# Patient Record
Sex: FEMALE | Race: WHITE
[De-identification: names, ages, dates, MRNs, and addresses within clinical notes are randomized per-mention and may not be internally consistent; named-entity substitution may affect disease eponyms.]

---

## 2018-01-04 ENCOUNTER — HOSPITAL ENCOUNTER (OUTPATIENT)
Dept: HOSPITAL 63 - MAMMO | Age: 78
Discharge: HOME | End: 2018-01-04
Payer: MEDICARE

## 2018-01-04 DIAGNOSIS — Z12.31: Primary | ICD-10-CM

## 2018-01-04 PROCEDURE — 77063 BREAST TOMOSYNTHESIS BI: CPT

## 2018-01-04 PROCEDURE — 77067 SCR MAMMO BI INCL CAD: CPT

## 2018-01-04 NOTE — RAD
DATE: 1/4/2018



EXAM: MAMMO BREANNA SCREENING BILATERAL



HISTORY: Routine screening



COMPARISON: 6/18/2014



This study was interpreted with the benefit of Computerized Aided Detection

(CAD).





The breast parenchyma is heterogeneously dense, which could reduce sensitivity

of mammography. Breast parenchyma level C.





FINDINGS: 2-D and 3-D tomosynthesis imaging was performed in CC and MLO

projections. No new or enlarging breast densities are seen. Several benign

type calcifications are noted. No suspicious microcalcifications have

developed.  





IMPRESSION: There is no mammographic evidence of malignancy in either breast.





BI-RADS CATEGORY: 2 BENIGN FINDING(S)



RECOMMENDED FOLLOW-UP: 12M 12 MONTH FOLLOW-UP



PQRS compliance statement: Patient information was entered into a reminder

system with a target due date     for the next mammogram.



Mammography is a sensitive method for finding small breast cancers, but it

does not detect them all and is not a substitute for careful clinical

examination.  A negative mammogram does not negate a clinically suspicious

finding and should not result in delay in biopsying a clinically suspicious

abnormality.



"Our facility is accredited by the [employer] Mammography

Program."

## 2018-02-16 ENCOUNTER — HOSPITAL ENCOUNTER (OUTPATIENT)
Dept: HOSPITAL 63 - SURG | Age: 78
Discharge: HOME | End: 2018-02-16
Attending: INTERNAL MEDICINE
Payer: MEDICARE

## 2018-02-16 VITALS
DIASTOLIC BLOOD PRESSURE: 56 MMHG | SYSTOLIC BLOOD PRESSURE: 121 MMHG | DIASTOLIC BLOOD PRESSURE: 56 MMHG | SYSTOLIC BLOOD PRESSURE: 121 MMHG

## 2018-02-16 DIAGNOSIS — K64.8: ICD-10-CM

## 2018-02-16 DIAGNOSIS — K57.30: ICD-10-CM

## 2018-02-16 DIAGNOSIS — K63.5: Primary | ICD-10-CM

## 2018-02-16 PROCEDURE — 88305 TISSUE EXAM BY PATHOLOGIST: CPT

## 2018-02-16 PROCEDURE — 45380 COLONOSCOPY AND BIOPSY: CPT

## 2018-02-16 PROCEDURE — 45378 DIAGNOSTIC COLONOSCOPY: CPT

## 2018-02-19 NOTE — PATHOLOGY
PATHOLOGY REPORT 

 

*    *    *    *    *    *    *    * 

 FINAL DIAGNOSIS:

Colonic mucosa, random colon biopsy:

- No significant pathologic abnormalities.

- Small incidental hyperplastic polyp.

(JPM:[email or web address] [email or web address]; 2018) 

 

COMMENT:

Sections of the random colon biopsy reveal multiple segments of

colonic mucosa containing several mucosal-associated lymphoid

aggregates.  There is no evidence of a chronic destructive colitis,

lymphocytic colitis, or collagenous colitis.There is a small

incidental hyperplastic polyp.

(JPM:miguel angel; 2018) 

 

 

REPORT ELECTRONICALLY SIGNED BY:   Goran Felton M.D.

DATE/TIME:   2018 15:54

*    *    *    *    *    *    *    *

 

GROSS PATHOLOGY:

Received in formalin labeled "Danna Posey, random colon BX," are 7

segments of tan soft tissue measuring 2.2 x 0.9 x 0.2 cm in aggregate

dimensions and ranging from 0.2 to 0.4 cm in maximum dimension.  The

specimen is submitted entirely in cassette A1.

(TSD; 2018)

 

 

 INITIAL CPT CODE(S):

A; 11535

Professional services performed by [facility] [place] 

[place] [address]

 

  Technical services performed by [facility] at [address] [place] [address].

  

 SPECIMEN(S) RECEIVED:

A.Random colon biopsy

 

 CLINICAL HISTORY:

Change in bowel habits, diarrhea 

 

 PATIENT:  DANNA POSEY

/AGE:  1940 (Age: 77)  

PATIENT #:  [id number]

ACCESSION #:  [id number]          ALT CASE #:   

SPECIMEN COLLECTION DATE:  2018

SPECIMEN RECEIVED DATE:  2018

   

[facility] - [address] [place] [address] - PHONE: 

[phone]

* * *  END OF REPORT  * * *

## 2020-11-05 ENCOUNTER — HOSPITAL ENCOUNTER (OUTPATIENT)
Dept: HOSPITAL 63 - MAMMO | Age: 80
End: 2020-11-05
Attending: INTERNAL MEDICINE
Payer: MEDICARE

## 2020-11-05 DIAGNOSIS — Z12.31: Primary | ICD-10-CM

## 2020-11-05 PROCEDURE — 77067 SCR MAMMO BI INCL CAD: CPT

## 2020-11-05 PROCEDURE — 77063 BREAST TOMOSYNTHESIS BI: CPT

## 2020-11-05 NOTE — RAD
EXAM: Bilateral digital screening mammogram with tomosynthesis.

 

HISTORY: 80-year-old female presents for screening mammography.

 

TECHNIQUE: Full-field digital craniocaudal and mediolateral oblique 2D and

3D tomosynthesis images of both breasts are obtained for evaluation. 

Computer aided detection was applied.

 

COMPARISON: 1/4/2018

 

BREAST PARENCHYMAL DENSITY: Level C - Heterogeneously dense.

 

FINDINGS: There is no new suspicious mass, microcalcification or region of

architectural distortion. 

 

IMPRESSION: BI-RADS Category 2: Benign finding(s). 

 

RECOMMENDATION: Annual mammography is recommended.

 

If your mammogram demonstrates that you have dense breast tissue, which 

could hide abnormalities, and if you have other risk factors for breast 

cancer that have been identified, you might benefit from supplemental 

screening tests that may be suggested by your ordering physician.  Dense 

breast tissue, in and of itself, is a relatively common condition.  This 

information is not provided to cause undue concern, but rather to raise 

your awareness and to promote discussion with your physician regarding the

presence of other risk factors, in addition to dense breast tissue. A 

report of your mammography results will be sent to you and your physician.

You should contact your physician if you have any questions or concerns 

regarding this report.  

 

Mammography is a sensitive method for finding small breast cancers, but it

does not detect them all and is not a substitute for careful clinical 

examination.  A negative mammogram does not negate a clinically suspicious

finding and should not result in delay in biopsying a clinically 

suspicious abnormality.

 

PQRS compliance statement -  Patient information was entered into a 

reminder system with a target due date for the next mammogram. 

 

"Our facility is accredited by the [employer] 

Mammography Program."

 

Electronically signed by: Aniyah Tovar MD (11/5/2020 11:47 AM) [id number]